# Patient Record
Sex: MALE | Race: WHITE
[De-identification: names, ages, dates, MRNs, and addresses within clinical notes are randomized per-mention and may not be internally consistent; named-entity substitution may affect disease eponyms.]

---

## 2017-03-08 ENCOUNTER — HOSPITAL ENCOUNTER (OUTPATIENT)
Dept: HOSPITAL 62 - CCC | Age: 57
End: 2017-03-08
Payer: COMMERCIAL

## 2017-03-08 DIAGNOSIS — M19.90: Primary | ICD-10-CM

## 2017-03-08 LAB
ALBUMIN SERPL-MCNC: 4.1 G/DL (ref 3.5–5)
ALP SERPL-CCNC: 89 U/L (ref 38–126)
ALT SERPL-CCNC: 25 U/L (ref 21–72)
ANION GAP SERPL CALC-SCNC: 9 MMOL/L (ref 5–19)
AST SERPL-CCNC: 20 U/L (ref 17–59)
BASOPHILS # BLD AUTO: 0.1 10^3/UL (ref 0–0.2)
BASOPHILS NFR BLD AUTO: 1.1 % (ref 0–2)
BILIRUB DIRECT SERPL-MCNC: 0 MG/DL (ref 0–0.3)
BILIRUB SERPL-MCNC: 0.7 MG/DL (ref 0.2–1.3)
BUN SERPL-MCNC: 15 MG/DL (ref 7–20)
CALCIUM: 10.1 MG/DL (ref 8.4–10.2)
CHLORIDE SERPL-SCNC: 105 MMOL/L (ref 98–107)
CHOLEST SERPL-MCNC: 170.29 MG/DL (ref 0–200)
CO2 SERPL-SCNC: 31 MMOL/L (ref 22–30)
CREAT SERPL-MCNC: 1.54 MG/DL (ref 0.52–1.25)
DIRECT HDL: 50 MG/DL (ref 40–?)
EOSINOPHIL # BLD AUTO: 0.2 10^3/UL (ref 0–0.6)
EOSINOPHIL NFR BLD AUTO: 3.3 % (ref 0–6)
ERYTHROCYTE [DISTWIDTH] IN BLOOD BY AUTOMATED COUNT: 12.7 % (ref 11.5–14)
FOLATE SERPL-MCNC: 3.1 NG/ML (ref 2.76–?)
GLUCOSE SERPL-MCNC: 87 MG/DL (ref 75–110)
HCT VFR BLD CALC: 50 % (ref 37.9–51)
HGB BLD-MCNC: 16.9 G/DL (ref 13.5–17)
HGB HCT DIFFERENCE: 0.7
LDLC SERPL DIRECT ASSAY-MCNC: 92 MG/DL (ref ?–100)
LYMPHOCYTES # BLD AUTO: 2.3 10^3/UL (ref 0.5–4.7)
LYMPHOCYTES NFR BLD AUTO: 37.5 % (ref 13–45)
MCH RBC QN AUTO: 32.2 PG (ref 27–33.4)
MCHC RBC AUTO-ENTMCNC: 33.8 G/DL (ref 32–36)
MCV RBC AUTO: 95 FL (ref 80–97)
MONOCYTES # BLD AUTO: 0.4 10^3/UL (ref 0.1–1.4)
MONOCYTES NFR BLD AUTO: 6.4 % (ref 3–13)
NEUTROPHILS # BLD AUTO: 3.2 10^3/UL (ref 1.7–8.2)
NEUTS SEG NFR BLD AUTO: 51.7 % (ref 42–78)
POTASSIUM SERPL-SCNC: 4.9 MMOL/L (ref 3.6–5)
PROT SERPL-MCNC: 6.4 G/DL (ref 6.3–8.2)
PSA SERPL-MCNC: 0.52 NG/ML (ref ?–4)
RBC # BLD AUTO: 5.25 10^6/UL (ref 4.35–5.55)
SODIUM SERPL-SCNC: 144.9 MMOL/L (ref 137–145)
TRIGL SERPL-MCNC: 90 MG/DL (ref ?–150)
VIT B12 SERPL-MCNC: 246 PG/ML (ref 239–931)
VLDLC SERPL CALC-MCNC: 18 MG/DL (ref 10–31)
WBC # BLD AUTO: 6.2 10^3/UL (ref 4–10.5)

## 2017-03-08 PROCEDURE — 83036 HEMOGLOBIN GLYCOSYLATED A1C: CPT

## 2017-03-08 PROCEDURE — 84153 ASSAY OF PSA TOTAL: CPT

## 2017-03-08 PROCEDURE — 82746 ASSAY OF FOLIC ACID SERUM: CPT

## 2017-03-08 PROCEDURE — 85025 COMPLETE CBC W/AUTO DIFF WBC: CPT

## 2017-03-08 PROCEDURE — 84443 ASSAY THYROID STIM HORMONE: CPT

## 2017-03-08 PROCEDURE — 80061 LIPID PANEL: CPT

## 2017-03-08 PROCEDURE — 36415 COLL VENOUS BLD VENIPUNCTURE: CPT

## 2017-03-08 PROCEDURE — 80053 COMPREHEN METABOLIC PANEL: CPT

## 2017-03-08 PROCEDURE — 82607 VITAMIN B-12: CPT

## 2017-09-13 ENCOUNTER — HOSPITAL ENCOUNTER (OUTPATIENT)
Dept: HOSPITAL 62 - OD | Age: 57
End: 2017-09-13
Attending: EMERGENCY MEDICINE
Payer: MEDICAID

## 2017-09-13 DIAGNOSIS — F33.1: Primary | ICD-10-CM

## 2017-09-13 LAB
ADD HIVPANEL?: NO
ALBUMIN SERPL-MCNC: 4.1 G/DL (ref 3.5–5)
ALP SERPL-CCNC: 82 U/L (ref 38–126)
ALT SERPL-CCNC: 24 U/L (ref 21–72)
ANION GAP SERPL CALC-SCNC: 8 MMOL/L (ref 5–19)
APPEARANCE UR: CLEAR
AST SERPL-CCNC: 25 U/L (ref 17–59)
BASOPHILS # BLD AUTO: 0.1 10^3/UL (ref 0–0.2)
BASOPHILS NFR BLD AUTO: 0.9 % (ref 0–2)
BILIRUB DIRECT SERPL-MCNC: 0.4 MG/DL (ref 0–0.4)
BILIRUB SERPL-MCNC: 0.7 MG/DL (ref 0.2–1.3)
BILIRUB UR QL STRIP: NEGATIVE
BUN SERPL-MCNC: 12 MG/DL (ref 7–20)
CALCIUM: 9.8 MG/DL (ref 8.4–10.2)
CHLORIDE SERPL-SCNC: 104 MMOL/L (ref 98–107)
CHOLEST SERPL-MCNC: 185.35 MG/DL (ref 0–200)
CO2 SERPL-SCNC: 30 MMOL/L (ref 22–30)
CREAT SERPL-MCNC: 1.3 MG/DL (ref 0.52–1.25)
DIRECT HDL: 57 MG/DL (ref 40–?)
EOSINOPHIL # BLD AUTO: 0.1 10^3/UL (ref 0–0.6)
EOSINOPHIL NFR BLD AUTO: 2 % (ref 0–6)
ERYTHROCYTE [DISTWIDTH] IN BLOOD BY AUTOMATED COUNT: 12.9 % (ref 11.5–14)
GLUCOSE SERPL-MCNC: 87 MG/DL (ref 75–110)
GLUCOSE UR STRIP-MCNC: NEGATIVE MG/DL
HCT VFR BLD CALC: 50 % (ref 37.9–51)
HGB BLD-MCNC: 16.7 G/DL (ref 13.5–17)
HGB HCT DIFFERENCE: 0.1
HIV (1 AND 2) ANTIBODY: NEGATIVE
KETONES UR STRIP-MCNC: NEGATIVE MG/DL
LDLC SERPL DIRECT ASSAY-MCNC: 104 MG/DL (ref ?–100)
LYMPHOCYTES # BLD AUTO: 2.1 10^3/UL (ref 0.5–4.7)
LYMPHOCYTES NFR BLD AUTO: 29.5 % (ref 13–45)
MCH RBC QN AUTO: 33.5 PG (ref 27–33.4)
MCHC RBC AUTO-ENTMCNC: 33.4 G/DL (ref 32–36)
MCV RBC AUTO: 100 FL (ref 80–97)
MONOCYTES # BLD AUTO: 0.4 10^3/UL (ref 0.1–1.4)
MONOCYTES NFR BLD AUTO: 6 % (ref 3–13)
NEUTROPHILS # BLD AUTO: 4.4 10^3/UL (ref 1.7–8.2)
NEUTS SEG NFR BLD AUTO: 61.6 % (ref 42–78)
NITRITE UR QL STRIP: NEGATIVE
PH UR STRIP: 7 [PH] (ref 5–9)
POTASSIUM SERPL-SCNC: 4.4 MMOL/L (ref 3.6–5)
PROT SERPL-MCNC: 6.3 G/DL (ref 6.3–8.2)
PROT UR STRIP-MCNC: NEGATIVE MG/DL
RBC # BLD AUTO: 4.98 10^6/UL (ref 4.35–5.55)
SODIUM SERPL-SCNC: 142 MMOL/L (ref 137–145)
SP GR UR STRIP: 1
T3FREE SERPL-MCNC: 3.58 PG/ML (ref 2.77–5.27)
TRIGL SERPL-MCNC: 87 MG/DL (ref ?–150)
TSH SERPL-ACNC: 1.05 UIU/ML (ref 0.47–4.68)
UROBILINOGEN UR-MCNC: NEGATIVE MG/DL (ref ?–2)
VLDLC SERPL CALC-MCNC: 17 MG/DL (ref 10–31)
WBC # BLD AUTO: 7.2 10^3/UL (ref 4–10.5)

## 2017-09-13 PROCEDURE — 87340 HEPATITIS B SURFACE AG IA: CPT

## 2017-09-13 PROCEDURE — 81001 URINALYSIS AUTO W/SCOPE: CPT

## 2017-09-13 PROCEDURE — 83036 HEMOGLOBIN GLYCOSYLATED A1C: CPT

## 2017-09-13 PROCEDURE — 86592 SYPHILIS TEST NON-TREP QUAL: CPT

## 2017-09-13 PROCEDURE — 84481 FREE ASSAY (FT-3): CPT

## 2017-09-13 PROCEDURE — 84402 ASSAY OF FREE TESTOSTERONE: CPT

## 2017-09-13 PROCEDURE — 84443 ASSAY THYROID STIM HORMONE: CPT

## 2017-09-13 PROCEDURE — 84146 ASSAY OF PROLACTIN: CPT

## 2017-09-13 PROCEDURE — 82977 ASSAY OF GGT: CPT

## 2017-09-13 PROCEDURE — 80061 LIPID PANEL: CPT

## 2017-09-13 PROCEDURE — 36415 COLL VENOUS BLD VENIPUNCTURE: CPT

## 2017-09-13 PROCEDURE — 80053 COMPREHEN METABOLIC PANEL: CPT

## 2017-09-13 PROCEDURE — 86701 HIV-1ANTIBODY: CPT

## 2017-09-13 PROCEDURE — 86803 HEPATITIS C AB TEST: CPT

## 2017-09-13 PROCEDURE — 86804 HEP C AB TEST CONFIRM: CPT

## 2017-09-13 PROCEDURE — 85025 COMPLETE CBC W/AUTO DIFF WBC: CPT

## 2017-09-13 PROCEDURE — 84439 ASSAY OF FREE THYROXINE: CPT

## 2017-09-14 LAB
HCV AB SER IA-ACNC: <0.1 S/CO RATIO (ref 0–0.9)
PROLACTIN SERPL-MCNC: 8.3 NG/ML (ref 4–15.2)
TESTOSTERONE FREE (DIRECT): 9.8 PG/ML (ref 7.2–24)

## 2017-10-09 ENCOUNTER — HOSPITAL ENCOUNTER (OUTPATIENT)
Dept: HOSPITAL 62 - OD | Age: 57
End: 2017-10-09
Attending: PHYSICIAN ASSISTANT
Payer: MEDICAID

## 2017-10-09 DIAGNOSIS — E87.5: Primary | ICD-10-CM

## 2017-10-09 LAB
ANION GAP SERPL CALC-SCNC: 8 MMOL/L (ref 5–19)
BUN SERPL-MCNC: 12 MG/DL (ref 7–20)
CALCIUM: 9.8 MG/DL (ref 8.4–10.2)
CHLORIDE SERPL-SCNC: 109 MMOL/L (ref 98–107)
CO2 SERPL-SCNC: 29 MMOL/L (ref 22–30)
CREAT SERPL-MCNC: 1.41 MG/DL (ref 0.52–1.25)
GLUCOSE SERPL-MCNC: 108 MG/DL (ref 75–110)
POTASSIUM SERPL-SCNC: 4.6 MMOL/L (ref 3.6–5)
SODIUM SERPL-SCNC: 146.3 MMOL/L (ref 137–145)

## 2017-10-09 PROCEDURE — 80048 BASIC METABOLIC PNL TOTAL CA: CPT

## 2017-10-09 PROCEDURE — 36415 COLL VENOUS BLD VENIPUNCTURE: CPT

## 2017-11-17 ENCOUNTER — HOSPITAL ENCOUNTER (OUTPATIENT)
Dept: HOSPITAL 62 - RAD | Age: 57
End: 2017-11-17
Attending: PHYSICIAN ASSISTANT
Payer: MEDICAID

## 2017-11-17 DIAGNOSIS — R04.2: ICD-10-CM

## 2017-11-17 DIAGNOSIS — R06.02: Primary | ICD-10-CM

## 2017-11-17 DIAGNOSIS — R07.0: ICD-10-CM

## 2017-11-17 PROCEDURE — 71260 CT THORAX DX C+: CPT

## 2017-11-17 PROCEDURE — 70491 CT SOFT TISSUE NECK W/DYE: CPT

## 2017-11-17 PROCEDURE — 82565 ASSAY OF CREATININE: CPT

## 2017-11-17 NOTE — RADIOLOGY REPORT (SQ)
EXAM DESCRIPTION:  CT SOFT TISSUE NECK WITH



COMPLETED DATE/TIME:  11/17/2017 3:44 pm



REASON FOR STUDY:  SOB (R06.02), PAIN IN THROAT (R07.0), HEMOPTYSIS (R04.2) R06.02  SHORTNESS OF KRISTAN
TH R07.0  PAIN IN THROAT R04.2  HEMOPTYSIS



COMPARISON:  CT chest same date 11/17/2017



TECHNIQUE:  Post IV contrasted scanning from skull base through lung apices with review of bone, soft
 tissue and lung windows.  Reconstructed coronal and sagittal MPR images reviewed.  All images stored
 on PACS.

All CT scanners at this facility use dose modulation, iterative reconstruction, and/or weight based d
osing when appropriate to reduce radiation dose to as low as reasonably achievable (ALARA).

[facility]: Dose Right  [facility]: CareDose    [facility]: Dose Right    [facility]: Teradose 4D    [facility]: Smart Technologies



CONTRAST TYPE AND DOSE:  80 mL Isovue 370- low osmolar.



RENAL FUNCTION:  Creatinine 1.3



RADIATION DOSE:  15 mGy .



LIMITATIONS:  None.



FINDINGS:  SKULL BASE: Intact.

MAJOR SALIVARY GLANDS: No solid or cystic masses.  No inflammatory changes.

LYMPHADENOPATHY: No adenopathy.  Single 7 mm short axis lymph node level 3, axial image 43.

MUCOSAL MASSES OR ASYMMETRY: No mucosal masses or asymmetry.

LARYNX/CORDS: No abnormal findings.

VASCULAR STRUCTURES: The major vessels are patent.

LUNG APICES: 2.5 cm spiculated mass in the right upper lobe worrisome for neoplasm

BONES: Intact.

THYROID: Normal size.  No masses.

PARANASAL SINUSES: Clear.

OTHER: No other significant finding.



IMPRESSION:  No findings in the neck soft tissues to explain history of hemoptysis.

2.5 cm spiculated right upper lobe mass worrisome for malignancy



TECHNICAL DOCUMENTATION:  JOB ID:  [id number]

Quality ID # 436: Final reports with documentation of one or more dose reduction techniques (e.g., Au
tomated exposure control, adjustment of the mA and/or kV according to patient size, use of iterative 
reconstruction technique)

 2011 [email or web address]- All Rights Reserved

## 2017-11-17 NOTE — RADIOLOGY REPORT (SQ)
EXAM DESCRIPTION:  CT CHEST WITH



COMPLETED DATE/TIME:  11/17/2017 3:44 pm



REASON FOR STUDY:  SOB (R06.02), PAIN IN THROAT (R07.0), HEMOPTYSIS (R04.2) R06.02  SHORTNESS OF KRISTAN
TH R07.0  PAIN IN THROAT R04.2  HEMOPTYSIS



COMPARISON:  None.



TECHNIQUE:  CT scan of the chest performed using helical scanning technique with dynamic intravenous 
contrast injection.  Images reviewed with lung, soft tissue and bone windows.  Reconstructed coronal 
and sagittal MPR images reviewed.  All images stored on PACS.

All CT scanners at this facility use dose modulation, iterative reconstruction, and/or weight based d
osing when appropriate to reduce radiation dose to as low as reasonably achievable (ALARA).

[facility]: Dose Right  [facility]: CareDose    [facility]: Dose Right    [facility]: Teradose 4D    [facility]: [employer]



CONTRAST TYPE AND DOSE:  contrast/concentration: Isovue 300.00 mg/ml; Total Contrast Delivered: 80.0 
ml; Total Saline Delivered: 55.0 ml



RENAL FUNCTION:  Creatinine 1.3



RADIATION DOSE:  Up-to-date CT equipment and radiation dose reduction techniques were employed. CTDIv
ol: 15.1 - 15.2 mGy. DLP: 1179 mGy-cm. .



LIMITATIONS:  None.



FINDINGS:  LUNGS AND PLEURA: There is a 1.9 x 2.5 cm spiculated mass in the right upper lobe.  This i
s suspicious for neoplasm.  No additional nodules.  No consolidation or effusions.

HILAR AND MEDIASTINAL STRUCTURES: There is mediastinal adenopathy in the precarinal region and prevas
cular region.  These are nonspecific and could be reactive or neoplastic.

HEART AND VASCULAR STRUCTURES: No aneurysm or dissection.  No central pulmonary emboli.  No pericardi
al effusion.

HARDWARE: None in the chest.

UPPER ABDOMEN: No significant findings.  Limited exam.

THYROID AND OTHER SOFT TISSUES: There are small nonspecific axillary lymph nodes identified.

BONES: There are mild wedge deformities in the mid dorsal spine.  Age of these are indeterminate.

OTHER: No other significant finding.



IMPRESSION:  1.9 x 2.5 cm spiculated mass in the right upper lobe.  This is suspicious for neoplasm. 
 There are small mediastinal nodes.  These are nonspecific.



TECHNICAL DOCUMENTATION:  JOB ID:  [id number]

Quality ID # 436: Final reports with documentation of one or more dose reduction techniques (e.g., Au
tomated exposure control, adjustment of the mA and/or kV according to patient size, use of iterative 
reconstruction technique)

 2011 [employer]- All Rights Reserved

## 2017-11-30 ENCOUNTER — HOSPITAL ENCOUNTER (OUTPATIENT)
Dept: HOSPITAL 62 - RAD | Age: 57
End: 2017-11-30
Attending: INTERNAL MEDICINE
Payer: MEDICAID

## 2017-11-30 DIAGNOSIS — C34.12: Primary | ICD-10-CM

## 2017-11-30 PROCEDURE — 70553 MRI BRAIN STEM W/O & W/DYE: CPT

## 2017-11-30 PROCEDURE — 82565 ASSAY OF CREATININE: CPT

## 2017-11-30 PROCEDURE — A9577 INJ MULTIHANCE: HCPCS

## 2017-11-30 PROCEDURE — 70250 X-RAY EXAM OF SKULL: CPT

## 2017-11-30 NOTE — RADIOLOGY REPORT (SQ)
EXAM DESCRIPTION:  MRI HEAD COMBO



COMPLETED DATE/TIME:  11/30/2017 11:03 am



REASON FOR STUDY:  C34.12 MALIGNANT NEOPLASM OF UPPER LOBE, LEFT BRONCHUS OR LUNG C34.12  MALIGNANT N
EOPLASM OF UPPER LOBE, LEFT BRONCHUS OR ANNELIESE



COMPARISON:  None.



TECHNIQUE:  Multiplanar imaging includes noncontrasted T1, T2, FLAIR, diffusion with ADC map and post
gadolinium contrast T1 sequences. Images stored on PACS.



CONTRAST TYPE AND DOSE:  15 mL Multihance.



RENAL FUNCTION:  GFR > 60.



LIMITATIONS:  None.



FINDINGS:  ANATOMY: Benign venous angioma left frontal perisylvian region, not clinically significant
. Normal arterial and venous flow voids. Pituitary fossa normal.

CSF SPACES: Normal in size and contour. No hemorrhage.

CEREBRUM: Sulci and gyri normal in size and contour. Normal white matter signal on FLAIR imaging. No 
evidence of hemorrhage, mass, or extraaxial fluid collection. No abnormal enhancement post contrast.

POSTERIOR FOSSA: No signal alteration. No hemorrhage. No edema, masses, or mass effect. Internal michael
tory canals, cerebellopontine angles, mastoids normal. No enhancing lesions. No abnormal enhancement 
post contrast.

DIFFUSION IMAGING: Negative for acute or subacute infarction.

ORBITS: No masses. Globes normal.

PARANASAL SINUSES: No fluid levels. Mucosa normal.

OTHER: No other significant finding.



IMPRESSION:  NORMAL MRI OF THE BRAIN WITHOUT AND WITH INTRAVENOUS GADOLINIUM CONTRAST.

EVIDENCE OF ACUTE STROKE: NO.



TECHNICAL DOCUMENTATION:  JOB ID:  [id number]

 2011 [employer]- All Rights Reserved

## 2017-11-30 NOTE — RADIOLOGY REPORT (SQ)
EXAM DESCRIPTION:  SKULL 1-3 VIEWS



COMPLETED DATE/TIME:  11/30/2017 9:47 am



REASON FOR STUDY:  MOORE AND LATERAL   FOREIGN BODY EVAL FOR MRI C34.12  MALIGNANT NEOPLASM OF UPPER
 LOBE, LEFT BRONCHUS OR ANNELIESE



COMPARISON:  None.



NUMBER OF VIEWS:  Two view.



TECHNIQUE:  Moore and lateral images of the facial bones acquired.



LIMITATIONS:  None.



FINDINGS:  ORBITS: No fracture. No foreign body.

SINUSES: No mucosal thickening. No air fluid levels.

FACIAL BONES: No fracture.

OTHER: No other significant finding.



IMPRESSION:  NO FOREIGN BODY OR FRACTURE OF THE FACIAL BONES.



TECHNICAL DOCUMENTATION:  JOB ID:  [id number]

 2011 [employer]- All Rights Reserved



FLUOROSCOPY TIME:  None

## 2017-12-03 ENCOUNTER — HOSPITAL ENCOUNTER (OUTPATIENT)
Dept: HOSPITAL 62 - RAD | Age: 57
End: 2017-12-03
Attending: INTERNAL MEDICINE
Payer: MEDICAID

## 2017-12-03 DIAGNOSIS — C34.12: Primary | ICD-10-CM

## 2017-12-03 PROCEDURE — 78815 PET IMAGE W/CT SKULL-THIGH: CPT

## 2017-12-03 PROCEDURE — A9552 F18 FDG: HCPCS

## 2017-12-04 NOTE — RADIOLOGY REPORT (SQ)
EXAM DESCRIPTION:  PET CT SKULL/THIGH



COMPLETED DATE/TIME:  12/3/2017 6:37 pm



REASON FOR STUDY:  LUNG CANCER C34.12  MALIGNANT NEOPLASM OF UPPER LOBE, LEFT BRONCHUS OR ANNELIESE



COMPARISON:  None.



RADIONUCLIDE AND DOSE:  10.95 mCi F18 FDG

The route of agent administration: Intravenous



FASTING BLOOD SUGAR:  86 mg/dl



CONTRAST TYPE AND DOSE:  No CT contrast given.



TECHNIQUE:  Blood glucose level was verified.  Above dose of FDG was injected intravenously.  2-D seg
mented attenuation correction images were obtained from the base of the skull to the midthighs.  Nonc
ontrast CT images were obtained for attenuation correction and fusion with emission images.  CT image
s were performed without oral or intravenous contrast and are not sensitive for parenchymal lesions. 
 A series of overlapping emission PET images were obtained.  [facility] by the radiologist.  Images stored on PACS.



LIMITATIONS:  None.



FINDINGS:  HEAD AND NECK: No areas of abnormal metabolic activity in the soft tissues of the head and
 neck.

CHEST: Recently described nodule right upper lobe measures 5.6 mean SUV.  No other sniff uptake in th
e chest.

ABDOMEN AND PELVIS: No areas of abnormal metabolic activity in the abdomen or pelvis.  Expected physi
ologic activity is present in the genitourinary system and bowel.

PROXIMAL LOWER EXTREMITIES: No areas of abnormal metabolic activity in the soft tissues of the lower 
extremities.

BONES: No abnormal metabolic activity in the visualized skeleton.

ADDITIONAL CT FINDINGS: No additional significant findings on the noncontrast CT images.

OTHER: No other significant findings.



IMPRESSION:  Hypermetabolic right upper lobe nodule.  No evidence of metastatic disease.



TECHNICAL DOCUMENTATION:  JOB ID:  [id number]

 [employer]- All Rights Reserved

## 2018-03-01 ENCOUNTER — HOSPITAL ENCOUNTER (EMERGENCY)
Dept: HOSPITAL 62 - ER | Age: 58
Discharge: HOME | End: 2018-03-01
Payer: MEDICAID

## 2018-03-01 VITALS — DIASTOLIC BLOOD PRESSURE: 88 MMHG | SYSTOLIC BLOOD PRESSURE: 133 MMHG

## 2018-03-01 DIAGNOSIS — G89.29: ICD-10-CM

## 2018-03-01 DIAGNOSIS — R06.00: Primary | ICD-10-CM

## 2018-03-01 DIAGNOSIS — M54.6: ICD-10-CM

## 2018-03-01 DIAGNOSIS — F41.9: ICD-10-CM

## 2018-03-01 LAB
ADD MANUAL DIFF: NO
ALBUMIN SERPL-MCNC: 4.2 G/DL (ref 3.5–5)
ALP SERPL-CCNC: 94 U/L (ref 38–126)
ALT SERPL-CCNC: 60 U/L (ref 21–72)
ANION GAP SERPL CALC-SCNC: 10 MMOL/L (ref 5–19)
AST SERPL-CCNC: 50 U/L (ref 17–59)
BASOPHILS # BLD AUTO: 0.1 10^3/UL (ref 0–0.2)
BASOPHILS NFR BLD AUTO: 1 % (ref 0–2)
BILIRUB DIRECT SERPL-MCNC: 0.5 MG/DL (ref 0–0.4)
BILIRUB SERPL-MCNC: 0.7 MG/DL (ref 0.2–1.3)
BUN SERPL-MCNC: 17 MG/DL (ref 7–20)
CALCIUM: 10.2 MG/DL (ref 8.4–10.2)
CHLORIDE SERPL-SCNC: 108 MMOL/L (ref 98–107)
CK MB SERPL-MCNC: 0.27 NG/ML (ref ?–4.55)
CK SERPL-CCNC: 38 U/L (ref 55–170)
CO2 SERPL-SCNC: 25 MMOL/L (ref 22–30)
EOSINOPHIL # BLD AUTO: 0.1 10^3/UL (ref 0–0.6)
EOSINOPHIL NFR BLD AUTO: 1.3 % (ref 0–6)
ERYTHROCYTE [DISTWIDTH] IN BLOOD BY AUTOMATED COUNT: 12.7 % (ref 11.5–14)
GLUCOSE SERPL-MCNC: 104 MG/DL (ref 75–110)
HCT VFR BLD CALC: 48.5 % (ref 37.9–51)
HGB BLD-MCNC: 17 G/DL (ref 13.5–17)
LYMPHOCYTES # BLD AUTO: 2.7 10^3/UL (ref 0.5–4.7)
LYMPHOCYTES NFR BLD AUTO: 26.1 % (ref 13–45)
MCH RBC QN AUTO: 32.4 PG (ref 27–33.4)
MCHC RBC AUTO-ENTMCNC: 35.1 G/DL (ref 32–36)
MCV RBC AUTO: 92 FL (ref 80–97)
MONOCYTES # BLD AUTO: 0.6 10^3/UL (ref 0.1–1.4)
MONOCYTES NFR BLD AUTO: 5.9 % (ref 3–13)
NEUTROPHILS # BLD AUTO: 6.9 10^3/UL (ref 1.7–8.2)
NEUTS SEG NFR BLD AUTO: 65.7 % (ref 42–78)
PLATELET # BLD: 234 10^3/UL (ref 150–450)
POTASSIUM SERPL-SCNC: 3.6 MMOL/L (ref 3.6–5)
PROT SERPL-MCNC: 6.6 G/DL (ref 6.3–8.2)
RBC # BLD AUTO: 5.25 10^6/UL (ref 4.35–5.55)
SODIUM SERPL-SCNC: 143.2 MMOL/L (ref 137–145)
TOTAL CELLS COUNTED % (AUTO): 100 %
TROPONIN I SERPL-MCNC: < 0.012 NG/ML
WBC # BLD AUTO: 10.5 10^3/UL (ref 4–10.5)

## 2018-03-01 PROCEDURE — 85025 COMPLETE CBC W/AUTO DIFF WBC: CPT

## 2018-03-01 PROCEDURE — 93005 ELECTROCARDIOGRAM TRACING: CPT

## 2018-03-01 PROCEDURE — 82550 ASSAY OF CK (CPK): CPT

## 2018-03-01 PROCEDURE — 82553 CREATINE MB FRACTION: CPT

## 2018-03-01 PROCEDURE — 80053 COMPREHEN METABOLIC PANEL: CPT

## 2018-03-01 PROCEDURE — 99285 EMERGENCY DEPT VISIT HI MDM: CPT

## 2018-03-01 PROCEDURE — 84484 ASSAY OF TROPONIN QUANT: CPT

## 2018-03-01 PROCEDURE — 93010 ELECTROCARDIOGRAM REPORT: CPT

## 2018-03-01 PROCEDURE — 71046 X-RAY EXAM CHEST 2 VIEWS: CPT

## 2018-03-01 PROCEDURE — 96372 THER/PROPH/DIAG INJ SC/IM: CPT

## 2018-03-01 PROCEDURE — 36415 COLL VENOUS BLD VENIPUNCTURE: CPT

## 2018-03-01 NOTE — RADIOLOGY REPORT (SQ)
EXAM DESCRIPTION:  CHEST PA/LAT



COMPLETED DATE/TIME:  3/1/2018 8:19 pm



REASON FOR STUDY:  hx lung resection, sob



COMPARISON:  11/17/2017 CT



EXAM PARAMETERS:  NUMBER OF VIEWS: two views

TECHNIQUE: Digital Frontal and Lateral radiographic views of the chest acquired.

RADIATION DOSE: NA

LIMITATIONS: none



FINDINGS:  LUNGS AND PLEURA: No acute opacities, masses or pneumothorax.  Postsurgical changes are pr
esent in the right lung.  No pleural effusion.

MEDIASTINUM AND HILAR STRUCTURES: Stable.

HEART AND VASCULAR STRUCTURES: Heart normal size.  No evidence for failure.

BONES: No acute findings.

HARDWARE: None in the chest.

OTHER: No other significant finding.



IMPRESSION:  No acute finding. Postsurgical changes are present in the right lung.



TECHNICAL DOCUMENTATION:  JOB ID:  [id number]

[id number]

 2011 [employer]- All Rights Reserved



Reading location - IP/workstation name: [email or web address]

## 2018-03-01 NOTE — ER DOCUMENT REPORT
ED General





- General


Chief Complaint: Anxiety


Stated Complaint: DIFFICULTY BREATHING


Time Seen by Provider: 03/01/18 19:57


Mode of Arrival: Ambulatory


TRAVEL OUTSIDE OF THE [place] IN LAST 30 DAYS: No





- HPI


Notes: 





Patient is a 57-year-old male who presents to the ED complaining of dyspnea on 

and off for the last 4 weeks (since his surgery) that has become more 

persistent over the last 1-2 days.  Patient states that he is also been 

anxious.  Pt states that he has had dypsnea like this during his anxiety 

attacks in the past. he was diagnosed with lung cancer in January and had 

resection of his right upper lobe removed of his lung.  Mother states that 

since then he has had increased episodes of anxiety.  Patient does have a 

medical history of anxiety and depression as well and is on multiple mental 

health medications.  Patient has been eating and drinking without any 

difficulties, but does have a decreased p.o. intake.  Patient still urinating 

normally and having normal bowel movements.  He has not had any recent illness 

otherwise.  He is currently not on any chemo or radiation and that will be 

decided at his follow-up visit in June. Pt has also had muscle tightness to his 

back (chronic x years). The pain does not radiate and is in the mid-back.  

Denies any headache, fever, neck pain, changes in vision/speech/mentation/

hearing, URI, sore throat, chest pain, palpitations, syncope, cough,  wheeze, 

abdominal pain, nausea/vomiting/diarrhea, urinary retention, dysuria, hematuria

, loss of control of bowel or bladder, numbness/tingling, saddle anesthesia, 

muscle paralysis/weakness, or rash.





- Related Data


Allergies/Adverse Reactions: 


 





No Known Allergies Allergy (Unverified 03/01/18 19:16)


 











Past Medical History





- General


Information source: Patient





- Social History


Smoking Status: Former Smoker


Chew tobacco use (# tins/day): No


Frequency of alcohol use: None


Drug Abuse: None


Family History: Reviewed & Not Pertinent


Patient has suicidal ideation: No


Patient has homicidal ideation: No


Renal/ Medical History: Denies: Hx Peritoneal Dialysis





Review of Systems





- Review of Systems


-: Yes All other systems reviewed and negative





Physical Exam





- Vital signs


Vitals: 


 











Pulse Ox


 


 99 


 


 03/01/18 20:47














- Notes


Notes: 





PHYSICAL EXAMINATION:





GENERAL: Well-appearing, well-nourished and in no acute distress.  A&Ox4.  

Answers questions appropriately.





HEAD: Atraumatic, normocephalic.





EYES: Pupils equal round and reactive to light, extraocular movements intact, 

sclera anicteric, conjunctiva are normal.





ENT: Nares patent and without discharge.  oropharynx clear without exudates.  

No tonsilar hypertrophy or erythema.  Moist mucous membranes.  





NECK: Normal range of motion, supple without lymphadenopathy





LUNGS: Breath sounds clear to auscultation bilaterally and equal.  No wheezes 

rales or rhonchi.  No retractions.





HEART: Regular rate and rhythm without murmurs, rubs, gallops.





ABDOMEN: Soft, nontender, nondistended abdomen.  No guarding, no rebound.  No 

masses appreciated.  Normal bowel sounds present.  No CVA tenderness 

bilaterally.





Musculoskeletal: FROM to passive/active. Strength 5+/5. 





Back:  FROM to passive/active.  Strength 5+/5.  No vertebral point tenderness 

or step-offs.  No erythema/ecchymosis/deformity.  + mild tenderness to the T-

paraspinal mm b/l.  SLR neg b/l. No foot drop.





Extremities:  No cyanosis, clubbing, or edema b/l.  Peripheral pulses 2+.  

Capillary refill less than 3 seconds.





NEUROLOGICAL: Cranial nerves grossly intact.  Normal speech, normal gait.  

Normal sensory, motor exams 





PSYCH: anxious, normal affect.





SKIN: Warm, Dry, normal turgor, no rashes or lesions noted.





Course





- Re-evaluation


Re-evalutation: 





03/01/18 21:52


Patient is an afebrile, well-hydrated, 57-year-old male who presents to the ED 

with chronic back pain as well as presumed anxiety.  Vitals are stable.  PE is 

otherwise unremarkable.  CBC, CMP, cardiac enzyme/EKG, chest x-ray were 

unremarkable for any acute pathology.  Patient has not been tachycardic, hypoxic

, or tachypneic.  Patient states that he feels better than when he first 

arrived.  Patient is tolerating p.o. without any difficulties.  Low suspicion 

for any ACS, PE, pneumothorax, pericarditis, dissection, respiratory compromise

, severe dehydration, sepsis, meningitis, meningitis, fracture, expanding/

ruptured AAA, cauda equina syndrome, epidural mass lesion/abscess, herniated 

disc causing severe spinal stenosis, or other systemic infection at this time.  

Patient is aware that his condition can change from initial presentation and 

that he needs monitor symptoms closely for any acute changes.  Toradol given IM 

today.  I will send him home with a prescription for Flexeril but he may use as 

needed with precautions reviewed.  Patient to have a recheck with his PCM in 2-

3 days.  Return to the ED with any worsening/concerning symptoms otherwise as 

reviewed discharge.  Patient is in agreement.





- Vital Signs


Vital signs: 


 











Temp Pulse Resp BP Pulse Ox


 


             99 


 


             03/01/18 20:47














- Laboratory


Result Diagrams: 


 03/01/18 20:25





 03/01/18 20:25


Laboratory results interpreted by me: 


 











  03/01/18





  20:25


 


Chloride  108 H


 


Creatinine  1.27 H


 


Est GFR (Non-Af Amer)  58 L


 


Direct Bilirubin  0.5 H


 


Creatine Kinase  38 L














Discharge





- Discharge


Clinical Impression: 


 Anxiety





Chronic back pain


Qualifiers:


 Back pain location: thoracic back pain Back pain laterality: bilateral 

Qualified Code(s): M54.6 - Pain in thoracic spine; G89.29 - Other chronic pain; 

G89.29 - Other chronic pain





Condition: Stable


Disposition: HOME, SELF-CARE


Instructions:  Anxiety ([facility]), Stretching Exercises for the Back ([facility])


Additional Instructions: 


Rest, Ice


Tylenol/ibuprofen as needed


Light stretches daily


Strength exercises as able


Moist heat and massage may help


Healthy diet


F/u with your PCP in 2-3 days for a recheck


Consider consult(s) with Orthopedics/physical therapy for ongoing/worsening 

symptoms





Return to the ED with any worsening symptoms and/or development of fever, 

headache, chest pain, palpitations, syncope, shortness of breath, trouble 

breathing, abdominal pain, n/v/d, blood in stool/urine, loss of control of bowel

/bladder, urinary retention, muscle weakness/paralysis, saddle anesthesia, 

numbness/tingling, or other worsening symptoms that are concerning to you.


Prescriptions: 


Cyclobenzaprine HCl [Flexeril 5 mg Tablet] 5 mg PO BID #10 tablet


Forms:  Elevated Blood Pressure


Referrals: 


DEDE FLYNN MD [Primary Care Provider] - 03/05/18

## 2018-03-01 NOTE — ER DOCUMENT REPORT
ED Medical Screen (RME)





- General


Chief Complaint: Anxiety


Stated Complaint: DIFFICULTY BREATHING


Time Seen by Provider: 03/01/18 19:57


Mode of Arrival: Ambulatory


Information source: Patient


Notes: 





57-year-old male history of anxiety fibromyalgia presents with complaints of 

shortness of breath, patient noticed lung resection past








I have greeted and performed a rapid initial assessment of this patient.  A 

comprehensive ED assessment and evaluation of the patient, analysis of test 

results and completion of the medical decision making process will be conducted 

by additional ED providers.





PHYSICAL EXAMINATION:





GENERAL: Well-appearing, well-nourished and in no acute distress.





HEAD: Atraumatic, normocephalic.





EYES: Pupils equal round extraocular movements intact,  conjunctiva are normal.





ENT: Nares patent





NECK: Normal range of motion





LUNGS: No respiratory distress





Musculoskeletal: Normal range of motion





NEUROLOGICAL:  Normal speech, normal gait. 





PSYCH: anxious 





SKIN: Warm, Dry, normal turgor, no rashes or lesions noted.


TRAVEL OUTSIDE OF THE [place] IN LAST 30 DAYS: No





- Related Data


Allergies/Adverse Reactions: 


 





No Known Allergies Allergy (Unverified 03/01/18 19:16)


 











Past Medical History





- Social History


Chew tobacco use (# tins/day): No


Frequency of alcohol use: None


Drug Abuse: None


Renal/ Medical History: Denies: Hx Peritoneal Dialysis





Doctor's Discharge





- Discharge


Instructions:  Anxiety ([facility])

## 2018-03-02 NOTE — EKG REPORT
SEVERITY:- BORDERLINE ECG -

SINUS RHYTHM

PROBABLE LEFT ATRIAL ABNORMALITY

RIGHT AXIS DEVIATION

:

Confirmed by: Santana Breaux MD 02-Mar-2018 06:45:16

## 2018-03-12 ENCOUNTER — HOSPITAL ENCOUNTER (EMERGENCY)
Dept: HOSPITAL 62 - ER | Age: 58
Discharge: HOME | End: 2018-03-12
Payer: MEDICAID

## 2018-03-12 VITALS — SYSTOLIC BLOOD PRESSURE: 134 MMHG | DIASTOLIC BLOOD PRESSURE: 91 MMHG

## 2018-03-12 DIAGNOSIS — Z90.2: ICD-10-CM

## 2018-03-12 DIAGNOSIS — Z87.891: ICD-10-CM

## 2018-03-12 DIAGNOSIS — R06.02: ICD-10-CM

## 2018-03-12 DIAGNOSIS — M79.7: ICD-10-CM

## 2018-03-12 DIAGNOSIS — Z79.899: ICD-10-CM

## 2018-03-12 DIAGNOSIS — Z90.5: ICD-10-CM
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
## 2018-03-12 DIAGNOSIS — R58: ICD-10-CM

## 2018-03-12 DIAGNOSIS — M54.9: ICD-10-CM

## 2018-03-12 DIAGNOSIS — I44.4: ICD-10-CM

## 2018-03-12 DIAGNOSIS — Z85.118: ICD-10-CM

## 2018-03-12 DIAGNOSIS — T14.8XXA: ICD-10-CM

## 2018-03-12 DIAGNOSIS — M54.2: Primary | ICD-10-CM

## 2018-03-12 LAB
ADD MANUAL DIFF: NO
ALBUMIN SERPL-MCNC: 4.9 G/DL (ref 3.5–5)
ALP SERPL-CCNC: 103 U/L (ref 38–126)
ALT SERPL-CCNC: 65 U/L (ref 21–72)
ANION GAP SERPL CALC-SCNC: 11 MMOL/L (ref 5–19)
AST SERPL-CCNC: 38 U/L (ref 17–59)
BASOPHILS # BLD AUTO: 0.1 10^3/UL (ref 0–0.2)
BASOPHILS NFR BLD AUTO: 0.6 % (ref 0–2)
BILIRUB DIRECT SERPL-MCNC: 0.4 MG/DL (ref 0–0.4)
BILIRUB SERPL-MCNC: 1.1 MG/DL (ref 0.2–1.3)
BUN SERPL-MCNC: 18 MG/DL (ref 7–20)
CALCIUM: 10.7 MG/DL (ref 8.4–10.2)
CHLORIDE SERPL-SCNC: 106 MMOL/L (ref 98–107)
CK MB SERPL-MCNC: 0.34 NG/ML (ref ?–4.55)
CK SERPL-CCNC: 46 U/L (ref 55–170)
CO2 SERPL-SCNC: 25 MMOL/L (ref 22–30)
EOSINOPHIL # BLD AUTO: 0.1 10^3/UL (ref 0–0.6)
EOSINOPHIL NFR BLD AUTO: 0.9 % (ref 0–6)
ERYTHROCYTE [DISTWIDTH] IN BLOOD BY AUTOMATED COUNT: 13.5 % (ref 11.5–14)
GLUCOSE SERPL-MCNC: 78 MG/DL (ref 75–110)
HCT VFR BLD CALC: 53.9 % (ref 37.9–51)
HGB BLD-MCNC: 18.6 G/DL (ref 13.5–17)
LYMPHOCYTES # BLD AUTO: 2.3 10^3/UL (ref 0.5–4.7)
LYMPHOCYTES NFR BLD AUTO: 20.8 % (ref 13–45)
MCH RBC QN AUTO: 32.5 PG (ref 27–33.4)
MCHC RBC AUTO-ENTMCNC: 34.4 G/DL (ref 32–36)
MCV RBC AUTO: 94 FL (ref 80–97)
MONOCYTES # BLD AUTO: 0.7 10^3/UL (ref 0.1–1.4)
MONOCYTES NFR BLD AUTO: 6.7 % (ref 3–13)
NEUTROPHILS # BLD AUTO: 7.9 10^3/UL (ref 1.7–8.2)
NEUTS SEG NFR BLD AUTO: 71 % (ref 42–78)
PLATELET # BLD: 296 10^3/UL (ref 150–450)
POTASSIUM SERPL-SCNC: 4.9 MMOL/L (ref 3.6–5)
PROT SERPL-MCNC: 7.7 G/DL (ref 6.3–8.2)
RBC # BLD AUTO: 5.72 10^6/UL (ref 4.35–5.55)
SODIUM SERPL-SCNC: 142.4 MMOL/L (ref 137–145)
TOTAL CELLS COUNTED % (AUTO): 100 %
TROPONIN I SERPL-MCNC: < 0.012 NG/ML
WBC # BLD AUTO: 11.1 10^3/UL (ref 4–10.5)

## 2018-03-12 PROCEDURE — 85025 COMPLETE CBC W/AUTO DIFF WBC: CPT

## 2018-03-12 PROCEDURE — 70491 CT SOFT TISSUE NECK W/DYE: CPT

## 2018-03-12 PROCEDURE — 82553 CREATINE MB FRACTION: CPT

## 2018-03-12 PROCEDURE — 71275 CT ANGIOGRAPHY CHEST: CPT

## 2018-03-12 PROCEDURE — 80053 COMPREHEN METABOLIC PANEL: CPT

## 2018-03-12 PROCEDURE — 84484 ASSAY OF TROPONIN QUANT: CPT

## 2018-03-12 PROCEDURE — 82550 ASSAY OF CK (CPK): CPT

## 2018-03-12 PROCEDURE — 36415 COLL VENOUS BLD VENIPUNCTURE: CPT

## 2018-03-12 PROCEDURE — 99285 EMERGENCY DEPT VISIT HI MDM: CPT

## 2018-03-12 NOTE — RADIOLOGY REPORT (SQ)
EXAM DESCRIPTION:  CTA CHEST



COMPLETED DATE/TIME:  3/12/2018 1:49 pm



REASON FOR STUDY:  Hx surgery lung cancer January, difficulty breathi



COMPARISON:  Chest x-ray dated 3/1/2018 and PET-CT scan dated December 2017



TECHNIQUE:  CT scan of the chest performed using helical scanning technique with dynamic intravenous 
contrast injection.  Images reviewed with lung, soft tissue and bone windows.  Reconstructed coronal 
and sagittal MPR images reviewed.

Additional 3 dimensional post-processing performed to develop Maximal Intensity Projection images (MI
P).  All images stored on PACS.

All CT scanners at this facility use dose modulation, iterative reconstruction, and/or weight based d
osing when appropriate to reduce radiation dose to as low as reasonably achievable (ALARA).

[facility]: Dose Right  [facility]: CareDose    [facility]: Dose Right    [facility]: Teradose 4D    [facility]: [employer]



CONTRAST TYPE AND DOSE:  contrast/concentration: Isovue 370.00 mg/ml; Total Contrast Delivered: 65.0 
ml; Total Saline Delivered: 100.1 ml

Contrast bolus optimized for the pulmonary arteries. Not diagnostic for the aorta.



RENAL FUNCTION:  Creatinine 1.27



RADIATION DOSE:  CT Rad equipment meets quality standard of care and radiation dose reduction techniq
ues were employed. CTDIvol: 7.6 - 29.8 mGy. DLP: 1483 mGy-cm. .



LIMITATIONS:  None.



FINDINGS:  LUNGS AND PLEURA: There are pleural-based and associated linear densities in the right upp
er lung field extending into the right lung apex and in the more inferior right middle lobe anterolat
erally with associated surgical clips or calcifications which presumably represent postsurgical peters
es.  The possibility of a recurrent neoplasm cannot be completely excluded however and if clinically 
warranted a follow-up PET-CT scan may be of value for further evaluation.  Remaining lung fields are 
clear.  No pleural effusions are identified.  No pneumothorax is seen.

AORTA AND GREAT VESSELS: No aneurysm.  Contrast bolus not optimized for the aorta.

HEART: No pericardial effusion. No significant coronary artery calcifications.

PULMONARY ARTERIES: No emboli visualized in the main pulmonary arteries or the segmental branches.

HILAR AND MEDIASTINAL STRUCTURES: There are some prominent mediastinal lymph nodes which appear essen
tially unchanged as compared to the previous PET-CT scan.  No abnormal metabolic activity was identif
ied on the PET-CT scan.

HARDWARE: None in the chest.

UPPER ABDOMEN: No significant findings.  Limited exam.

THYROID AND OTHER SOFT TISSUES: No masses.  No adenopathy.

BONES: There is some mild compression of a couple of the mid thoracic vertebra which do not appear to
 be pathologic in nature.

3D MIPS: Confirm above findings.

OTHER: No other significant finding.



IMPRESSION:  No evidence for pulmonary embolic disease.  No acute consolidations or pleural effusions
.  Pleural-based and associated linear lung parenchymal densities in the right hemithorax is noted ab
ove most consistent with postsurgical changes.  The possibility of a recurrent neoplasm cannot be com
pletely excluded however and if clinically warranted a follow-up PET-CT scan may be of value for furt
her evaluation.  Other findings as noted above



COMMENT:  Quality ID # 436: Final reports with documentation of one or more dose reduction techniques
 (e.g., Automated exposure control, adjustment of the mA and/or kV according to patient size, use of 
iterative reconstruction technique)



TECHNICAL DOCUMENTATION:  JOB ID:  [id number]

 2011 [employer]- All Rights Reserved



Reading location - IP/workstation name: [email or web address]

## 2018-03-12 NOTE — RADIOLOGY REPORT (SQ)
EXAM DESCRIPTION:  CT SOFT TISSUE NECK WITH



COMPLETED DATE/TIME:  3/12/2018 1:49 pm



REASON FOR STUDY:  Neck pain after fall; c-spine recons



COMPARISON:  November 2017



TECHNIQUE:  Post IV contrasted scanning from skull base through lung apices with review of bone, soft
 tissue and lung windows.  Reconstructed coronal and sagittal MPR images reviewed.  All images stored
 on PACS.

All CT scanners at this facility use dose modulation, iterative reconstruction, and/or weight based d
osing when appropriate to reduce radiation dose to as low as reasonably achievable (ALARA).

[facility]: Dose Right  [facility]: CareDose    [facility]: Dose Right    [facility]: Teradose 4D    [facility]: [employer]



CONTRAST TYPE AND DOSE:  65.2 Isovue 370



RENAL FUNCTION:  Creatinine 1.27



RADIATION DOSE:   .



LIMITATIONS:  None.



FINDINGS:  SKULL BASE: Intact.

MAJOR SALIVARY GLANDS: No solid or cystic masses.  No inflammatory changes.

LYMPHADENOPATHY: No adenopathy.

MUCOSAL MASSES OR ASYMMETRY: No mucosal masses or asymmetry.

LARYNX/CORDS: No abnormal findings.

VASCULAR STRUCTURES: The major vessels are patent.

LUNG APICES: Clear.

BONES: Intact.  There is some minimal anterior osteophytic lipping at the C5-C6 level

THYROID: Normal size.  No masses.

PARANASAL SINUSES: Clear.

OTHER: No other significant finding.



IMPRESSION:  NO SIGNIFICANT FINDING IN THE SOFT TISSUES OF THE NECK.



TECHNICAL DOCUMENTATION:  JOB ID:  [id number]

Quality ID # 436: Final reports with documentation of one or more dose reduction techniques (e.g., Au
tomated exposure control, adjustment of the mA and/or kV according to patient size, use of iterative 
reconstruction technique)

 2011 [email or web address]- All Rights Reserved



Reading location - IP/workstation name: [email or web address]

## 2018-03-28 ENCOUNTER — HOSPITAL ENCOUNTER (OUTPATIENT)
Dept: HOSPITAL 62 - RAD | Age: 58
End: 2018-03-28
Attending: PHYSICIAN ASSISTANT
Payer: MEDICAID

## 2018-03-28 DIAGNOSIS — R10.11: Primary | ICD-10-CM

## 2018-03-28 PROCEDURE — 76705 ECHO EXAM OF ABDOMEN: CPT
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
## 2018-06-07 ENCOUNTER — HOSPITAL ENCOUNTER (OUTPATIENT)
Dept: HOSPITAL 62 - RAD | Age: 58
End: 2018-06-07
Attending: INTERNAL MEDICINE
Payer: MEDICAID

## 2018-06-07 DIAGNOSIS — C34.11: Primary | ICD-10-CM

## 2018-06-07 PROCEDURE — 71250 CT THORAX DX C-: CPT

## 2018-06-07 NOTE — RADIOLOGY REPORT (SQ)
EXAM DESCRIPTION:  CT CHEST WITHOUT



COMPLETED DATE/TIME:  6/7/2018 8:31 am



REASON FOR STUDY:  LUNG CANCER C34.11  MALIGNANT NEOPLASM OF UPPER LOBE, RIGHT BRONCHUS OR L



COMPARISON:  CTA of the chest dated March 2018



TECHNIQUE:  CT scan performed of the chest without intravenous contrast.  Images reviewed with lung, 
soft tissue and bone windows.  Reconstructed coronal and sagittal MPR images reviewed.  All images st
ored on PACS.

All CT scanners at this facility use dose modulation, iterative reconstruction, and/or weight based d
osing when appropriate to reduce radiation dose to as low as reasonably achievable (ALARA).

[facility]: Dose Right  [facility]: CareDose    [facility]: Dose Right    [facility]: Teradose 4D    [facility]: Smart Technologies



RADIATION DOSE:  CT Rad equipment meets quality standard of care and radiation dose reduction techniq
ues were employed. CTDIvol: 5.6 mGy. DLP: 232 mGy-cm. mGy.



LIMITATIONS:  No technical limitations.



FINDINGS:  LUNGS AND PLEURA: The previously described pleural-based and associated linear densities i
n the right hemithorax are again identified and appears stable.  The remaining lung fields are clear.
  No pleural effusions are identified.  No pneumothorax is seen.

HILAR AND MEDIASTINAL STRUCTURES: The previously described prominent mediastinal lymph nodes appears 
stable.

HEART AND VASCULAR STRUCTURES: No aneurysm.  No pericardial effusion.

UPPER ABDOMEN: No significant findings.  Limited exam.

THYROID AND OTHER SOFT TISSUES: No masses.  No adenopathy.

BONES: Stable findings.

HARDWARE: None in the chest.

OTHER: No other significant findings.



IMPRESSION:  No significant interval changes compared to the previous study.  The previously describe
d pleural-based and associated linear densities in the right hemithorax appears stable.  No acute darryn
nges are identified.  Other findings as noted above



TECHNICAL DOCUMENTATION:  JOB ID:  [id number]

Quality ID # 436: Final reports with documentation of one or more dose reduction techniques (e.g., Au
tomated exposure control, adjustment of the mA and/or kV according to patient size, use of iterative 
reconstruction technique)

 2011 [employer]- All Rights Reserved



Reading location - IP/workstation name: [email or web address]

## 2018-12-10 ENCOUNTER — HOSPITAL ENCOUNTER (OUTPATIENT)
Dept: HOSPITAL 62 - RAD | Age: 58
End: 2018-12-10
Attending: PHYSICIAN ASSISTANT
Payer: MEDICAID

## 2018-12-10 DIAGNOSIS — C34.11: Primary | ICD-10-CM

## 2018-12-10 PROCEDURE — 71250 CT THORAX DX C-: CPT

## 2018-12-10 NOTE — RADIOLOGY REPORT (SQ)
EXAM DESCRIPTION:  CT CHEST WITHOUT



COMPLETED DATE/TIME:  12/10/2018 8:14 am



REASON FOR STUDY:  LUNG CA (C34.11) C34.11  MALIGNANT NEOPLASM OF UPPER LOBE, RIGHT BRONCHUS OR L



COMPARISON:  6/7/2018



TECHNIQUE:  CT scan performed of the chest without intravenous contrast.  Images reviewed with lung, 
soft tissue and bone windows.  Reconstructed coronal and sagittal MPR images reviewed.  All images st
ored on PACS.

All CT scanners at this facility use dose modulation, iterative reconstruction, and/or weight based d
osing when appropriate to reduce radiation dose to as low as reasonably achievable (ALARA).

[facility]: Dose Right  [facility]: CareDose    [facility]: Dose Right    [facility]: Teradose 4D    [facility]: Smart [employer]



RADIATION DOSE:  CT Rad equipment meets quality standard of care and radiation dose reduction techniq
ues were employed. CTDIvol: 6.3 mGy. DLP: 266 mGy-cm. mGy.



LIMITATIONS:  No technical limitations.



FINDINGS:  LUNGS AND PLEURA: Stable postsurgical scarring on the right.  No masses, infiltrates, or p
neumothorax.  No pleural effusions or pleural calcifications.

HILAR AND MEDIASTINAL STRUCTURES: No identified masses or abnormal nodes.  No obvious aneurysm.

HEART AND VASCULAR STRUCTURES: No aneurysm.  No pericardial effusion.

UPPER ABDOMEN: Limited evaluation.  Status post left nephrectomy.  No acute abnormality.

THYROID AND OTHER SOFT TISSUES: No masses.  No adenopathy.

BONES: No acute finding.  Old compression fractures at T6 and T7

HARDWARE: None in the chest.

OTHER: No other significant findings.



IMPRESSION:  1.  Stable postsurgical scarring on the right.  No evidence of local recurrence or acute
 abnormality.



TECHNICAL DOCUMENTATION:  JOB ID:  [id number]

Quality ID # 436: Final reports with documentation of one or more dose reduction techniques (e.g., Au
tomated exposure control, adjustment of the mA and/or kV according to patient size, use of iterative 
reconstruction technique)

 2011 [employer]- All Rights Reserved



Reading location - IP/workstation name: [email or web address]

## 2019-06-10 ENCOUNTER — HOSPITAL ENCOUNTER (OUTPATIENT)
Dept: HOSPITAL 62 - RAD | Age: 59
End: 2019-06-10
Attending: INTERNAL MEDICINE
Payer: MEDICAID

## 2019-06-10 DIAGNOSIS — C34.11: Primary | ICD-10-CM

## 2019-06-10 PROCEDURE — 71250 CT THORAX DX C-: CPT

## 2019-06-10 NOTE — RADIOLOGY REPORT (SQ)
EXAM DESCRIPTION:  CT CHEST WITHOUT



COMPLETED DATE/TIME:  6/10/2019 8:56 am



REASON FOR STUDY:  LUNG CA C34.11  MALIGNANT NEOPLASM OF UPPER LOBE, RIGHT BRONCHUS OR L



COMPARISON:  12/10/2018 and 11/17/2017.



TECHNIQUE:  CT scan performed of the chest without intravenous contrast.  Images reviewed with lung, 
soft tissue and bone windows.  Reconstructed coronal and sagittal MPR images reviewed.  All images st
ored on PACS.

All CT scanners at this facility use dose modulation, iterative reconstruction, and/or weight based d
osing when appropriate to reduce radiation dose to as low as reasonably achievable (ALARA).

[facility]: Dose Right  [facility]: CareDose    [facility]: Dose Right    [facility]: Teradose 4D    [facility]: [employer]



RADIATION DOSE:  CT Rad equipment meets quality standard of care and radiation dose reduction techniq
ues were employed. CTDIvol: 5.3 mGy. DLP: 219 mGy-cm.



LIMITATIONS:  No technical limitations.



FINDINGS:  LUNGS AND PLEURA:  Stable post surgical changes in the right hemithorax.  Stable very tiny
 nodule in the periphery of the left upper lobe, axial image 35, series 4.  Minimal areas of scattere
d scar in the left lung.  No acute pulmonary consolidation.  Small lung cyst in the right lower lobe,
 stable finding.  No pneumothorax or pleural effusion.  The central airways are clear.

HILAR AND MEDIASTINAL STRUCTURES: No significant interval changes.

HEART AND VASCULAR STRUCTURES: No aneurysm.  No pericardial effusion.

UPPER ABDOMEN:  Stable right renal cyst.  Prior left nephrectomy. Limited exam.

THYROID AND OTHER SOFT TISSUES: No masses.  No adenopathy.

BONES:  The osseous structures are stable in appearance.  Stable compression deformities at T6 and T7
.

HARDWARE: None in the chest.

OTHER: No other significant findings.



IMPRESSION:  1.  No significant interval changes since the prior study dated 12/10/2018.  Stable post
 surgical changes in the right hemithorax.

2.  Stable very tiny left upper lobe pulmonary nodule since examination dating back to 11/17/2017.



TECHNICAL DOCUMENTATION:  JOB ID:  [id number]

Quality ID # 436: Final reports with documentation of one or more dose reduction techniques (e.g., Au
tomated exposure control, adjustment of the mA and/or kV according to patient size, use of iterative 
reconstruction technique)

 2011 [employer] All Rights Reserved



Reading location - IP[email or web address]

## 2019-10-11 ENCOUNTER — HOSPITAL ENCOUNTER (OUTPATIENT)
Dept: HOSPITAL 62 - RAD | Age: 59
End: 2019-10-11
Attending: INTERNAL MEDICINE
Payer: MEDICAID

## 2019-10-11 DIAGNOSIS — C34.11: Primary | ICD-10-CM

## 2019-10-11 PROCEDURE — 71250 CT THORAX DX C-: CPT

## 2019-10-11 NOTE — RADIOLOGY REPORT (SQ)
EXAM DESCRIPTION:  CT CHEST WITHOUT



COMPLETED DATE/TIME:  10/11/2019 7:49 am



REASON FOR STUDY:  MALIGNANT NEOPLASM OF UPPER LOBE, RIGHT BRONCHUS OR LUNG C34.11  MALIGNANT NEOPLAS
M OF UPPER LOBE, RIGHT BRONCHUS OR L



COMPARISON:  6/10/2019



TECHNIQUE:  CT scan performed of the chest without intravenous contrast.  Images reviewed with lung, 
soft tissue and bone windows.  Reconstructed coronal and sagittal MPR images reviewed.  All images st
ored on PACS.

All CT scanners at this facility use dose modulation, iterative reconstruction, and/or weight based d
osing when appropriate to reduce radiation dose to as low as reasonably achievable (ALARA).

[facility]: Dose Right  [facility]: CareDose    [facility]: Dose Right    [facility]: Teradose 4D    [facility]: Smart [employer]



RADIATION DOSE:  CT Rad equipment meets quality standard of care and radiation dose reduction techniq
ues were employed. CTDIvol: 5.2 - 6.0 mGy. DLP: 267 mGy-cm. mGy.



LIMITATIONS:  No technical limitations.



FINDINGS:  LUNGS AND PLEURA: Stable postoperative findings of right upper lobectomy.  No masses, infi
ltrates, or pneumothorax.  No pleural effusions or pleural calcifications.

HILAR AND MEDIASTINAL STRUCTURES: No identified masses or abnormal nodes.  No obvious aneurysm.

HEART AND VASCULAR STRUCTURES: No aneurysm.  No pericardial effusion.

UPPER ABDOMEN: No significant findings.  Limited exam.

THYROID AND OTHER SOFT TISSUES: No masses.  No adenopathy.

BONES: No significant finding.

HARDWARE: None in the chest.

OTHER: No other significant findings.



IMPRESSION:  Stable postoperative findings of right upper lobectomy.



TECHNICAL DOCUMENTATION:  JOB ID:  [id number]

Quality ID # 436: Final reports with documentation of one or more dose reduction techniques (e.g., Au
tomated exposure control, adjustment of the mA and/or kV according to patient size, use of iterative 
reconstruction technique)

 2011 [employer]- All Rights Reserved



Reading location - IP/workstation name: [email or web address]

## 2019-10-16 ENCOUNTER — HOSPITAL ENCOUNTER (OUTPATIENT)
Dept: HOSPITAL 62 - RAD | Age: 59
End: 2019-10-16
Attending: FAMILY MEDICINE
Payer: MEDICAID

## 2019-10-16 DIAGNOSIS — C34.11: Primary | ICD-10-CM

## 2019-10-16 PROCEDURE — 82565 ASSAY OF CREATININE: CPT

## 2019-10-16 PROCEDURE — 74177 CT ABD & PELVIS W/CONTRAST: CPT

## 2019-10-16 NOTE — RADIOLOGY REPORT (SQ)
EXAM DESCRIPTION:  CT ABD/PELVIS WITH IV ONLY



COMPLETED DATE/TIME:  10/16/2019 10:37 am



REASON FOR STUDY:  RIGHT LUNG CA (C34.11) C34.11  MALIGNANT NEOPLASM OF UPPER LOBE, RIGHT BRONCHUS OR
 L



COMPARISON:  10/13/2015.



TECHNIQUE:  CT scan of the abdomen and pelvis performed using helical scanning technique with dynamic
 intravenous contrast injection.  No oral contrast. Images reviewed with lung, soft tissue, and bone 
windows. Reconstructed coronal and sagittal MPR images reviewed. Delayed images for evaluation of the
 urinary system also acquired. All images stored on PACS.

All CT scanners at this facility use dose modulation, iterative reconstruction, and/or weight based d
osing when appropriate to reduce radiation dose to as low as reasonably achievable (ALARA).

[facility]: Dose Right  [facility]: CareDose    [facility]: Dose Right    [facility]: Teradose 4D    [facility]: [employer]



CONTRAST TYPE AND DOSE:  contrast/concentration: Isovue 300.00 mg/ml; Total Contrast Delivered: 50.0 
ml; Total Saline Delivered: 71.0 ml



RENAL FUNCTION:  Creatinine 1.4.



RADIATION DOSE:  CT Rad equipment meets quality standard of care and radiation dose reduction techniq
ues were employed. CTDIvol: 5.2 - 6.0 mGy. DLP: 593 mGy-cm..



LIMITATIONS:  None.



FINDINGS:  LOWER CHEST: No significant findings. No nodules or infiltrates.

LIVER: Normal size. No masses.  No dilated ducts.

SPLEEN: Normal size. No focal lesions.

PANCREAS: No masses. No significant calcifications. No adjacent inflammation or peripancreatic fluid 
collections. Pancreatic duct not dilated.

GALLBLADDER: No identified stones by CT criteria. No inflammatory changes to suggest cholecystitis.

ADRENAL GLANDS: No significant masses or asymmetry.

RIGHT KIDNEY AND URETER: Stable cortical cyst in the upper pole.  No solid masses.   No significant c
alcifications.   No hydronephrosis or hydroureter.

LEFT KIDNEY AND URETER: No solid masses.   No significant calcifications.   No hydronephrosis or hydr
oureter.

AORTA AND VESSELS: No aneurysm. No dissection. Renal arteries, SMA, celiac without stenosis.

RETROPERITONEUM: No retroperitoneal adenopathy, hemorrhage or masses.

BOWEL AND PERITONEAL CAVITY: No masses or inflammatory changes. No free fluid or peritoneal masses.

APPENDIX: Normal.

PELVIS: No mass.  No free fluid. Normal bladder.

ABDOMINAL WALL: No masses. No hernias.

BONES: No significant or acute findings.

OTHER: No other significant finding.



IMPRESSION:  NO SIGNIFICANT OR ACUTE FINDING IN THE ABDOMEN OR PELVIS ON CT SCAN WITH IV CONTRAST.  I
NCIDENTAL STABLE CORTICAL CYST IN THE RIGHT KIDNEY.



TECHNICAL DOCUMENTATION:  JOB ID:  [id number]

Quality ID # 436: Final reports with documentation of one or more dose reduction techniques (e.g., Au
tomated exposure control, adjustment of the mA and/or kV according to patient size, use of iterative 
reconstruction technique)

 2011 [employer]- All Rights Reserved



Reading location - IP/workstation name: [email or web address]

## 2019-11-29 ENCOUNTER — HOSPITAL ENCOUNTER (EMERGENCY)
Dept: HOSPITAL 62 - ER | Age: 59
LOS: 2 days | Discharge: HOME | End: 2019-12-01
Payer: COMMERCIAL

## 2019-11-29 DIAGNOSIS — F41.9: ICD-10-CM

## 2019-11-29 DIAGNOSIS — F17.200: ICD-10-CM

## 2019-11-29 DIAGNOSIS — Z79.899: ICD-10-CM

## 2019-11-29 DIAGNOSIS — R04.0: ICD-10-CM

## 2019-11-29 DIAGNOSIS — F31.0: ICD-10-CM

## 2019-11-29 DIAGNOSIS — T42.4X1A: Primary | ICD-10-CM

## 2019-11-29 DIAGNOSIS — Z62.820: ICD-10-CM
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
## 2019-11-29 LAB
ADD MANUAL DIFF: NO
ALBUMIN SERPL-MCNC: 3.3 G/DL (ref 3.5–5)
ALP SERPL-CCNC: 81 U/L (ref 38–126)
ANION GAP SERPL CALC-SCNC: 6 MMOL/L (ref 5–19)
APPEARANCE UR: CLEAR
APTT PPP: YELLOW S
AST SERPL-CCNC: 21 U/L (ref 17–59)
BARBITURATES UR QL SCN: NEGATIVE
BASOPHILS # BLD AUTO: 0.1 10^3/UL (ref 0–0.2)
BASOPHILS NFR BLD AUTO: 0.7 % (ref 0–2)
BILIRUB DIRECT SERPL-MCNC: 0.1 MG/DL (ref 0–0.4)
BILIRUB SERPL-MCNC: 0.4 MG/DL (ref 0.2–1.3)
BILIRUB UR QL STRIP: NEGATIVE
BUN SERPL-MCNC: 18 MG/DL (ref 7–20)
CALCIUM: 8.8 MG/DL (ref 8.4–10.2)
CHLORIDE SERPL-SCNC: 109 MMOL/L (ref 98–107)
CO2 SERPL-SCNC: 27 MMOL/L (ref 22–30)
EOSINOPHIL # BLD AUTO: 0.1 10^3/UL (ref 0–0.6)
EOSINOPHIL NFR BLD AUTO: 1.2 % (ref 0–6)
ERYTHROCYTE [DISTWIDTH] IN BLOOD BY AUTOMATED COUNT: 13.1 % (ref 11.5–14)
ETHANOL SERPL-MCNC: < 10 MG/DL
GLUCOSE SERPL-MCNC: 87 MG/DL (ref 75–110)
GLUCOSE UR STRIP-MCNC: NEGATIVE MG/DL
HCT VFR BLD CALC: 45.1 % (ref 37.9–51)
HGB BLD-MCNC: 15.4 G/DL (ref 13.5–17)
KETONES UR STRIP-MCNC: NEGATIVE MG/DL
LYMPHOCYTES # BLD AUTO: 2.2 10^3/UL (ref 0.5–4.7)
LYMPHOCYTES NFR BLD AUTO: 23.4 % (ref 13–45)
MCH RBC QN AUTO: 33.3 PG (ref 27–33.4)
MCHC RBC AUTO-ENTMCNC: 34.2 G/DL (ref 32–36)
MCV RBC AUTO: 97 FL (ref 80–97)
METHADONE UR QL SCN: NEGATIVE
MONOCYTES # BLD AUTO: 0.7 10^3/UL (ref 0.1–1.4)
MONOCYTES NFR BLD AUTO: 7.8 % (ref 3–13)
NEUTROPHILS # BLD AUTO: 6.2 10^3/UL (ref 1.7–8.2)
NEUTS SEG NFR BLD AUTO: 66.9 % (ref 42–78)
NITRITE UR QL STRIP: NEGATIVE
PCP UR QL SCN: NEGATIVE
PH UR STRIP: 6 [PH] (ref 5–9)
PLATELET # BLD: 221 10^3/UL (ref 150–450)
POTASSIUM SERPL-SCNC: 4 MMOL/L (ref 3.6–5)
PROT SERPL-MCNC: 5.7 G/DL (ref 6.3–8.2)
PROT UR STRIP-MCNC: NEGATIVE MG/DL
RBC # BLD AUTO: 4.64 10^6/UL (ref 4.35–5.55)
SP GR UR STRIP: 1.02
TOTAL CELLS COUNTED % (AUTO): 100 %
URINE AMPHETAMINES SCREEN: NEGATIVE
URINE BENZODIAZEPINES SCREEN: NEGATIVE
URINE COCAINE SCREEN: NEGATIVE
URINE MARIJUANA (THC) SCREEN: NEGATIVE
UROBILINOGEN UR-MCNC: NEGATIVE MG/DL (ref ?–2)
WBC # BLD AUTO: 9.3 10^3/UL (ref 4–10.5)

## 2019-11-29 PROCEDURE — 36415 COLL VENOUS BLD VENIPUNCTURE: CPT

## 2019-11-29 PROCEDURE — 80053 COMPREHEN METABOLIC PANEL: CPT

## 2019-11-29 PROCEDURE — 72050 X-RAY EXAM NECK SPINE 4/5VWS: CPT

## 2019-11-29 PROCEDURE — 80307 DRUG TEST PRSMV CHEM ANLYZR: CPT

## 2019-11-29 PROCEDURE — 96360 HYDRATION IV INFUSION INIT: CPT

## 2019-11-29 PROCEDURE — 71260 CT THORAX DX C+: CPT

## 2019-11-29 PROCEDURE — 74177 CT ABD & PELVIS W/CONTRAST: CPT

## 2019-11-29 PROCEDURE — 86901 BLOOD TYPING SEROLOGIC RH(D): CPT

## 2019-11-29 PROCEDURE — 81001 URINALYSIS AUTO W/SCOPE: CPT

## 2019-11-29 PROCEDURE — 85025 COMPLETE CBC W/AUTO DIFF WBC: CPT

## 2019-11-29 PROCEDURE — 86900 BLOOD TYPING SEROLOGIC ABO: CPT

## 2019-11-29 PROCEDURE — 70450 CT HEAD/BRAIN W/O DYE: CPT

## 2019-11-29 PROCEDURE — 99285 EMERGENCY DEPT VISIT HI MDM: CPT

## 2019-11-29 PROCEDURE — 86850 RBC ANTIBODY SCREEN: CPT
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
## 2019-11-30 RX ADMIN — GABAPENTIN SCH MG: 400 CAPSULE ORAL at 18:07

## 2019-11-30 RX ADMIN — BUSPIRONE HYDROCHLORIDE SCH MG: 10 TABLET ORAL at 18:07
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
## 2019-12-01 VITALS — DIASTOLIC BLOOD PRESSURE: 77 MMHG | SYSTOLIC BLOOD PRESSURE: 126 MMHG

## 2019-12-01 RX ADMIN — GABAPENTIN SCH MG: 400 CAPSULE ORAL at 11:27

## 2019-12-01 RX ADMIN — BUSPIRONE HYDROCHLORIDE SCH MG: 10 TABLET ORAL at 11:28
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
## 2020-01-17 ENCOUNTER — HOSPITAL ENCOUNTER (OUTPATIENT)
Dept: HOSPITAL 62 - OD | Age: 60
End: 2020-01-17
Attending: FAMILY MEDICINE
Payer: MEDICAID

## 2020-01-17 DIAGNOSIS — R94.4: ICD-10-CM

## 2020-01-17 DIAGNOSIS — R07.9: Primary | ICD-10-CM

## 2020-01-17 LAB
ANION GAP SERPL CALC-SCNC: 8 MMOL/L (ref 5–19)
BUN SERPL-MCNC: 21 MG/DL (ref 7–20)
CALCIUM: 9.9 MG/DL (ref 8.4–10.2)
CHLORIDE SERPL-SCNC: 106 MMOL/L (ref 98–107)
CO2 SERPL-SCNC: 28 MMOL/L (ref 22–30)
GLUCOSE SERPL-MCNC: 94 MG/DL (ref 75–110)
POTASSIUM SERPL-SCNC: 4.6 MMOL/L (ref 3.6–5)

## 2020-01-17 PROCEDURE — 80048 BASIC METABOLIC PNL TOTAL CA: CPT

## 2020-01-17 PROCEDURE — 71046 X-RAY EXAM CHEST 2 VIEWS: CPT

## 2020-01-17 PROCEDURE — 36415 COLL VENOUS BLD VENIPUNCTURE: CPT

## 2020-01-17 NOTE — RADIOLOGY REPORT (SQ)
EXAM DESCRIPTION:  CHEST PA/LATERAL



COMPLETED DATE/TIME:  1/17/2020 10:06 am



REASON FOR STUDY:  CHEST PAIN, UNSPECIFIED



COMPARISON:  3/1/2018



EXAM PARAMETERS:  NUMBER OF VIEWS: two views

TECHNIQUE: Digital Frontal and Lateral radiographic views of the chest acquired.

RADIATION DOSE: NA

LIMITATIONS: none



FINDINGS:  LUNGS AND PLEURA:  Stable post surgical changes in the right lung.  No acute pulmonary con
solidation.  No pneumothorax or pleural effusion.

MEDIASTINUM AND HILAR STRUCTURES: No masses or contour abnormalities.

HEART AND VASCULAR STRUCTURES: Heart normal size.  No evidence for failure.

BONES: No acute findings.

HARDWARE: None in the chest.

OTHER: No other significant finding.



IMPRESSION:  1.  No significant interval changes since the prior study dated 3/1/2018.  No acute find
ings.



TECHNICAL DOCUMENTATION:  JOB ID:  [id number]

 2011 [employer]- All Rights Reserved



Reading location - IP/workstation name: [email or web address]

## 2020-01-27 ENCOUNTER — HOSPITAL ENCOUNTER (OUTPATIENT)
Dept: HOSPITAL 62 - RAD | Age: 60
End: 2020-01-27
Attending: PHYSICIAN ASSISTANT
Payer: MEDICAID

## 2020-01-27 DIAGNOSIS — R07.9: Primary | ICD-10-CM

## 2020-01-27 DIAGNOSIS — R11.0: ICD-10-CM

## 2020-01-27 PROCEDURE — 78227 HEPATOBIL SYST IMAGE W/DRUG: CPT

## 2020-01-27 PROCEDURE — A9537 TC99M MEBROFENIN: HCPCS

## 2020-01-27 PROCEDURE — 76705 ECHO EXAM OF ABDOMEN: CPT

## 2020-01-27 NOTE — RADIOLOGY REPORT (SQ)
EXAM DESCRIPTION:  U/S ABDOMEN LIMITED W/O DOP



COMPLETED DATE/TIME:  1/27/2020 8:52 am



REASON FOR STUDY:  NAUSEA, CHEST PAIN R11.0  NAUSEA R07.9  CHEST PAIN, UNSPECIFIED



COMPARISON:  None.



TECHNIQUE:  Dynamic and static grayscale images acquired of the abdomen and recorded on PACS. Additio
nal selected color Doppler and spectral images recorded.



LIMITATIONS:  None.



FINDINGS:  PANCREAS: Visualized portions the pancreas are normal in appearance.

LIVER: No masses. Echotexture normal.

LIVER VASCULATURE: Normal directional flow of the main portal vein and hepatic veins.

GALLBLADDER: No stones. Normal wall thickness. No pericholecystic fluid.

ULTRASOUND-DETECTED GONZALEZ'S SIGN: Negative.

INTRAHEPATIC DUCTS AND COMMON DUCT: CBD and intrahepatic ducts normal caliber. No filling defects.

INFERIOR VENA CAVA: Normal flow.

AORTA: No aneurysm.

RIGHT KIDNEY:  The right kidney measures 10.4 cm in length.  Small cyst is again noted.  No hydroneph
rosis.  Normal echogenicity.

PERITONEAL AND RIGHT PLEURAL SPACE: No ascites or effusions.

OTHER: No other significant findings.



IMPRESSION:  No significant findings in the right upper quadrant.



TECHNICAL DOCUMENTATION:  JOB ID:  [id number]

 2011 [employer]- All Rights Reserved



Reading location - IP/workstation name: [email or web address]

## 2020-01-27 NOTE — RADIOLOGY REPORT (SQ)
EXAM DESCRIPTION:  NM HIDA SCAN WITH CCK



COMPLETED DATE/TIME:  1/27/2020 10:57 am



REASON FOR STUDY:  CHRONIC NAUSEA R11.0  NAUSEA R07.9  CHEST PAIN, UNSPECIFIED



COMPARISON:  None.



RADIONUCLIDE AND DOSE:  DOSAGE RADIONUCLIDE: 5.45 millicuries Tc99m Mebrofenin.

DOSAGE CCK: 1.6 micrograms.

DOSAGE MORPHINE: Not required.

The route of agent administration: Intravenous



TECHNIQUE:  Serial imaging right upper quadrant up to 60 minutes following injection of radionuclide.
  CCK injected after gallbladder visualized.



LIMITATIONS:  None.



FINDINGS:  LIVER: Normal visualization without areas of photopenia.

INTRAHEPATIC BILE DUCTS: Normal size and no delay in visualization.

COMMON BILE DUCT: Normal without dilatation.

GALLBLADDER:   Normal visualization.  Calculated ejection fraction of 85%. Normal range is greater th
an 35%.

PHYSICAL RESPONSE:  Patients presenting complaint was reproduced.

OTHER: No other significant finding.



IMPRESSION:  NORMAL STUDY WITHOUT CYSTIC OR COMMON DUCT OBSTRUCTION.  NORMAL GALLBLADDER EJECTION FRA
CTION.  NO EVIDENCE FOR BILIARY DYSKINESIS.  PATIENT'S CLINICAL SYMPTOMS WERE REPRODUCED.



TECHNICAL DOCUMENTATION:  JOB ID:  [id number]

 2011 [employer]- All Rights Reserved



Reading location - IP/workstation name: [email or web address]

## 2020-03-03 ENCOUNTER — HOSPITAL ENCOUNTER (OUTPATIENT)
Dept: HOSPITAL 62 - RAD | Age: 60
End: 2020-03-03
Attending: INTERNAL MEDICINE
Payer: MEDICAID

## 2020-03-03 DIAGNOSIS — K31.84: Primary | ICD-10-CM

## 2020-03-03 DIAGNOSIS — R11.0: ICD-10-CM

## 2020-03-03 PROCEDURE — A9541 TC99M SULFUR COLLOID: HCPCS

## 2020-03-03 PROCEDURE — 78264 GASTRIC EMPTYING IMG STUDY: CPT

## 2020-03-03 NOTE — RADIOLOGY REPORT (SQ)
EXAM DESCRIPTION:  NM GASTRIC EMPTYING STUDY



COMPLETED DATE/TIME:  3/3/2020 2:56 pm



REASON FOR STUDY:  NAUSEA R11.0  NAUSEA



COMPARISON:  None.



RADIONUCLIDE AND DOSE:  2 millicuries Tc-99m Sulfur Colloid.

Egg salad sandwich

The route of agent administration: Oral.



TECHNIQUE:  1 minute serial static imaging performed at time of meal, 1 hour, 2 hours, 3 hours, and 4
 hours as needed.  Once stomach reaches 90% emptying, the test is complete. Image intensity values pl
otted with respect to time with linear regression algorithm.



LIMITATIONS:  None.



FINDINGS:  Patient was observed for 4 hours.

Immediate post meal serves as baseline.

Gastric emptying at 30 minutes was 10.8%.

Gastric emptying at 60 minutes was 21.5%

Gastric emptying at 90 minutes was 32.3%.

Gastric emptying at 120 minutes was 43.1%.

Gastric emptying at 240 minutes was 86.2%.

Normal values:

60 minutes: 30-90% retained. If less than 30%, abnormally rapid emptying. If greater than 90%, delaye
d gastric emptying.

120 minutes: <60% retained. If greater than 60%, delayed gastric emptying.

240 minutes: <10% retained. If greater than 10%, delayed gastric emptying.



IMPRESSION:  Mild gastroparesis.  At 0400 hours the gastric emptying was 86.2%.  90% or greater is co
nsidered normal.



TECHNICAL DOCUMENTATION:  JOB ID:  [id number]

 2011 [email or web address]- All Rights Reserved                           rev-5/18



Reading location - IP/workstation name: [email or web address]

## 2020-03-10 ENCOUNTER — HOSPITAL ENCOUNTER (OUTPATIENT)
Dept: HOSPITAL 62 - OD | Age: 60
End: 2020-03-10
Attending: FAMILY MEDICINE
Payer: MEDICAID

## 2020-03-10 DIAGNOSIS — E87.5: Primary | ICD-10-CM

## 2020-03-10 LAB
ANION GAP SERPL CALC-SCNC: 10 MMOL/L (ref 5–19)
BUN SERPL-MCNC: 19 MG/DL (ref 7–20)
CALCIUM: 9.8 MG/DL (ref 8.4–10.2)
CHLORIDE SERPL-SCNC: 105 MMOL/L (ref 98–107)
CO2 SERPL-SCNC: 30 MMOL/L (ref 22–30)
GLUCOSE SERPL-MCNC: 103 MG/DL (ref 75–110)
POTASSIUM SERPL-SCNC: 5.2 MMOL/L (ref 3.6–5)

## 2020-03-10 PROCEDURE — 80048 BASIC METABOLIC PNL TOTAL CA: CPT

## 2020-03-10 PROCEDURE — 36415 COLL VENOUS BLD VENIPUNCTURE: CPT

## 2020-04-24 ENCOUNTER — HOSPITAL ENCOUNTER (OUTPATIENT)
Dept: HOSPITAL 62 - RAD | Age: 60
End: 2020-04-24
Attending: INTERNAL MEDICINE
Payer: MEDICAID

## 2020-04-24 DIAGNOSIS — C34.11: Primary | ICD-10-CM

## 2020-04-24 PROCEDURE — 71250 CT THORAX DX C-: CPT

## 2020-04-24 NOTE — RADIOLOGY REPORT (SQ)
EXAM DESCRIPTION:  CT CHEST WITHOUT



IMAGES COMPLETED DATE/TIME:  4/24/2020 7:54 am



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
 NEOPLASM OF UPPER LOBE, RIGHT BRONCHUS OR L



COMPARISON:  11/29/2019



TECHNIQUE:  CT scan performed of the chest without intravenous contrast.  Images reviewed with lung, 
soft tissue and bone windows.  Reconstructed coronal and sagittal MPR images reviewed.  All images st
ored on PACS.

All CT scanners at this facility use dose modulation, iterative reconstruction, and/or weight based d
osing when appropriate to reduce radiation dose to as low as reasonably achievable (ALARA).

[facility]: Dose Right  [facility]: CareDose    [facility]: Dose Right    [facility]: Teradose 4D    [facility]: Smart Technologies



RADIATION DOSE:  CT Rad equipment meets quality standard of care and radiation dose reduction techniq
ues were employed. CTDIvol: 10.5 mGy. DLP: 473 mGy-cm. mGy.



LIMITATIONS:  No technical limitations.



FINDINGS:  LUNGS AND PLEURA: Postsurgical changes from the right upper lobectomy.  Minimal linear sca
rring within the apex in along the right middle lobe anterior laterally.  No new discrete nodules or 
masses.  No focal airspace disease.  No pleural effusion or pneumothorax.

HILAR AND MEDIASTINAL STRUCTURES: No discrete adenopathy.  Postsurgical changes within the right hilu
m with surgical chain staple line.

HEART AND VASCULAR STRUCTURES: Normal size.  No pericardial effusion.  Scattered coronary atheroscler
osis.

UPPER ABDOMEN: See separate report of the CT of the abdomen.

THYROID AND OTHER SOFT TISSUES: Unremarkable thyroid.

BONES: No acute bony abnormality.  No discrete lytic or blastic osseous lesions.  Unchanged T6-7 endp
late irregularities.

HARDWARE: None in the chest.

OTHER: No other significant findings.



IMPRESSION:  1.  Stable postsurgical changes within the right hemithorax without evidence of residual
 or recurrent disease.

2.  No evidence of acute cardiopulmonary process.

3.  See same-day abdominal CT for findings below the diaphragm.



TECHNICAL DOCUMENTATION:  JOB ID:  [id number]

Quality ID # [id number]: Final reports with documentation of one or more dose reduction techniques (e.g., Au
tomated exposure control, adjustment of the mA and/or kV according to patient size, use of iterative 
reconstruction technique)

 2011 [employer]- All Rights Reserved



Reading location - IP/workstation name: [facility]-

## 2020-06-04 ENCOUNTER — HOSPITAL ENCOUNTER (OUTPATIENT)
Dept: HOSPITAL 62 - OD | Age: 60
End: 2020-06-04
Attending: FAMILY MEDICINE
Payer: MEDICAID

## 2020-06-04 DIAGNOSIS — E87.5: Primary | ICD-10-CM

## 2020-06-04 LAB
ANION GAP SERPL CALC-SCNC: 6 MMOL/L (ref 5–19)
BUN SERPL-MCNC: 22 MG/DL (ref 7–20)
CALCIUM: 9.5 MG/DL (ref 8.4–10.2)
CHLORIDE SERPL-SCNC: 105 MMOL/L (ref 98–107)
CO2 SERPL-SCNC: 28 MMOL/L (ref 22–30)
GLUCOSE SERPL-MCNC: 111 MG/DL (ref 75–110)
POTASSIUM SERPL-SCNC: 5 MMOL/L (ref 3.6–5)

## 2020-06-04 PROCEDURE — 36415 COLL VENOUS BLD VENIPUNCTURE: CPT

## 2020-06-04 PROCEDURE — 80048 BASIC METABOLIC PNL TOTAL CA: CPT

## 2020-10-26 ENCOUNTER — HOSPITAL ENCOUNTER (OUTPATIENT)
Dept: HOSPITAL 62 - RAD | Age: 60
End: 2020-10-26
Attending: INTERNAL MEDICINE
Payer: MEDICAID

## 2020-10-26 DIAGNOSIS — C34.11: Primary | ICD-10-CM

## 2020-10-26 PROCEDURE — 71250 CT THORAX DX C-: CPT

## 2020-12-15 ENCOUNTER — HOSPITAL ENCOUNTER (OUTPATIENT)
Dept: HOSPITAL 62 - RAD | Age: 60
End: 2020-12-15
Attending: FAMILY MEDICINE
Payer: MEDICAID

## 2020-12-15 DIAGNOSIS — M79.632: Primary | ICD-10-CM

## 2020-12-15 DIAGNOSIS — R07.81: ICD-10-CM

## 2020-12-15 PROCEDURE — 71110 X-RAY EXAM RIBS BIL 3 VIEWS: CPT

## 2020-12-15 NOTE — RADIOLOGY REPORT (SQ)
EXAM DESCRIPTION:  HUMERUS LEFT



IMAGES COMPLETED DATE/TIME:  12/15/2020 10:46 am



REASON FOR STUDY:  L FOREARM PAIN M79.632  PAIN IN LEFT FOREARM R07.81  PLEURODYNIA



COMPARISON:  None.



NUMBER OF VIEWS:  Two views.



TECHNIQUE:  Two radiographic images were acquired of the left humerus to include elbow and shoulder i
n at least one projection.



LIMITATIONS:  None.



FINDINGS:  MINERALIZATION: Normal.

BONES: There is a fracture of the humeral neck.

SOFT TISSUES: No obvious swelling or foreign body.

OTHER: No other significant finding.



IMPRESSION:  Humeral neck fracture.



TECHNICAL DOCUMENTATION:  JOB ID:  [id number]

 2011 [employer]- All Rights Reserved



Reading location - IP/workstation name: [email or web address]